# Patient Record
Sex: FEMALE | Race: AMERICAN INDIAN OR ALASKA NATIVE
[De-identification: names, ages, dates, MRNs, and addresses within clinical notes are randomized per-mention and may not be internally consistent; named-entity substitution may affect disease eponyms.]

---

## 2019-07-27 ENCOUNTER — HOSPITAL ENCOUNTER (EMERGENCY)
Dept: HOSPITAL 5 - ED | Age: 24
Discharge: HOME | End: 2019-07-27
Payer: SELF-PAY

## 2019-07-27 VITALS — DIASTOLIC BLOOD PRESSURE: 73 MMHG | SYSTOLIC BLOOD PRESSURE: 129 MMHG

## 2019-07-27 DIAGNOSIS — J02.9: Primary | ICD-10-CM

## 2019-07-27 PROCEDURE — 99283 EMERGENCY DEPT VISIT LOW MDM: CPT

## 2019-07-27 PROCEDURE — 87430 STREP A AG IA: CPT

## 2019-07-27 PROCEDURE — 96372 THER/PROPH/DIAG INJ SC/IM: CPT

## 2019-07-27 NOTE — EMERGENCY DEPARTMENT REPORT
ED ENT HPI





- General


Chief complaint: Sore Throat


Stated complaint: THROAT PAIN/UNABLE TO SWALLOW


Time Seen by Provider: 07/27/19 03:24


Source: patient


Mode of arrival: Ambulatory


Limitations: No Limitations





- History of Present Illness


MD complaint: sore throat





- Related Data


                                  Previous Rx's











 Medication  Instructions  Recorded  Last Taken  Type


 


Lidocaine Viscous 2% 10 ml MM Q3H PRN #240 ml 07/27/19 Unknown Rx











                                    Allergies











Allergy/AdvReac Type Severity Reaction Status Date / Time


 


No Known Allergies Allergy   Unverified 07/27/19 02:27














ED Dental HPI





- General


Chief complaint: Sore Throat


Stated complaint: THROAT PAIN/UNABLE TO SWALLOW


Time Seen by Provider: 07/27/19 03:24


Source: patient


Mode of arrival: Ambulatory


Limitations: No Limitations





- Related Data


                                  Previous Rx's











 Medication  Instructions  Recorded  Last Taken  Type


 


Lidocaine Viscous 2% 10 ml MM Q3H PRN #240 ml 07/27/19 Unknown Rx











                                    Allergies











Allergy/AdvReac Type Severity Reaction Status Date / Time


 


No Known Allergies Allergy   Unverified 07/27/19 02:27














ED Review of Systems


ROS: 


Stated complaint: THROAT PAIN/UNABLE TO SWALLOW


Other details as noted in HPI








ED Past Medical Hx





- Past Medical History


Previous Medical History?: No





- Surgical History


Past Surgical History?: Yes


Additional Surgical History: Abdominal Pain





- Social History


Smoking Status: Never Smoker


Substance Use Type: None





- Medications


Home Medications: 


                                Home Medications











 Medication  Instructions  Recorded  Confirmed  Last Taken  Type


 


Lidocaine Viscous 2% 10 ml MM Q3H PRN #240 ml 07/27/19  Unknown Rx














ED Physical Exam





- General


Limitations: No Limitations





ED Course





                                   Vital Signs











  07/27/19





  02:24


 


Temperature 99 F


 


Pulse Rate 94 H


 


Respiratory 16





Rate 


 


Blood Pressure 129/73


 


O2 Sat by Pulse 98





Oximetry 











Critical care attestation.: 


If time is entered above; I have spent that time in minutes in the direct care 

of this critically ill patient, excluding procedure time.








ED Disposition


Disposition: DC-01 TO HOME OR SELFCARE


Condition: Stable


Instructions:  Pharyngitis (ED), Strep Throat (ED), Mononucleosis (ED)


Prescriptions: 


Lidocaine Viscous 2% 10 ml MM Q3H PRN #240 ml


 PRN Reason: Sore Throat


Referrals: 


[facility] [Provider Group] - 3-5 Days

## 2020-09-06 ENCOUNTER — HOSPITAL ENCOUNTER (EMERGENCY)
Dept: HOSPITAL 5 - ED | Age: 25
Discharge: HOME | End: 2020-09-06
Payer: SELF-PAY

## 2020-09-06 VITALS — DIASTOLIC BLOOD PRESSURE: 92 MMHG | SYSTOLIC BLOOD PRESSURE: 159 MMHG

## 2020-09-06 DIAGNOSIS — J20.9: Primary | ICD-10-CM

## 2020-09-06 DIAGNOSIS — F12.10: ICD-10-CM

## 2020-09-06 PROCEDURE — 99283 EMERGENCY DEPT VISIT LOW MDM: CPT

## 2020-09-06 PROCEDURE — 71046 X-RAY EXAM CHEST 2 VIEWS: CPT

## 2020-09-06 PROCEDURE — 96372 THER/PROPH/DIAG INJ SC/IM: CPT

## 2020-09-06 NOTE — EMERGENCY DEPARTMENT REPORT
- General


Chief Complaint: Upper Respiratory Infection


Stated Complaint: BODYACHES/CP/HEADACHE/COUGH


Time Seen by Provider: 09/06/20 15:29


Source: patient


Mode of arrival: Ambulatory


Limitations: No Limitations





- History of Present Illness


Initial Comments: 





Patient is a 24-year-old female presents emergency room with complaints of 

generalized body aches that began 2 days ago.  She has associated dry cough, 

congestion, chest discomfort after frequent coughing.  She states that she does 

have some mild shortness of breath.  She states that she has had one episode of 

vomiting a day.  She states that she has had a few episodes of diarrhea.  She 

denies any fever, sore throat, ear pain, abdominal pain.  She denies any known 

sick contacts.  She denies any recent travel.  She states that she has been 

taking Mucinex and cold and flu medication over-the-counter.  She has a past 

medical history of childhood asthma.  No allergies to medications.  She endorses

marijuana use.





- Related Data


                                  Previous Rx's











 Medication  Instructions  Recorded  Last Taken  Type


 


Lidocaine Viscous 2% 10 ml MM Q3H PRN #240 ml 07/27/19 Unknown Rx


 


Albuterol Sulfate [Proventil Hfa] 6.7 gm IH TID PRN #1 hfa.aer.ad 09/06/20 

Unknown Rx


 


Azithromycin [Zithromax TAB] 250 mg PO QDAY 5 Days #6 tablet 09/06/20 Unknown Rx


 


Prednisone [predniSONE 10 mg 10 mg PO .TAPER #1 tab.ds.pk 09/06/20 Unknown Rx





(6-Day Pack, 21 Tabs)]    











                                    Allergies











Allergy/AdvReac Type Severity Reaction Status Date / Time


 


No Known Allergies Allergy   Unverified 07/27/19 02:27














ED Review of Systems


ROS: 


Stated complaint: BODYACHES/CP/HEADACHE/COUGH


Other details as noted in HPI





Comment: All other systems reviewed and negative





ED Past Medical Hx





- Past Medical History


Previous Medical History?: No





- Surgical History


Additional Surgical History: Abdominal Pain





- Social History


Smoking Status: Never Smoker


Substance Use Type: Marijuana





- Medications


Home Medications: 


                                Home Medications











 Medication  Instructions  Recorded  Confirmed  Last Taken  Type


 


Lidocaine Viscous 2% 10 ml MM Q3H PRN #240 ml 07/27/19  Unknown Rx


 


Albuterol Sulfate [Proventil Hfa] 6.7 gm IH TID PRN #1 hfa.aer.ad 09/06/20  

Unknown Rx


 


Azithromycin [Zithromax TAB] 250 mg PO QDAY 5 Days #6 tablet 09/06/20  Unknown 

Rx


 


Prednisone [predniSONE 10 mg 10 mg PO .TAPER #1 tab.ds.pk 09/06/20  Unknown Rx





(6-Day Pack, 21 Tabs)]     














ED Physical Exam





- General


Limitations: No Limitations


General appearance: alert, in no apparent distress





- Head


Head exam: Present: atraumatic, normocephalic





- Eye


Eye exam: Present: normal appearance





- ENT


ENT exam: Present: normal orophraynx, mucous membranes moist, TM's normal 

bilaterally, normal external ear exam





- Respiratory


Respiratory exam: Present: wheezes (expiratory bilaterally), rhonchi 

(bilaterally).  Absent: respiratory distress, rales, stridor, chest wall 

tenderness, accessory muscle use, decreased breath sounds, prolonged expiratory





- Cardiovascular


Cardiovascular Exam: Present: regular rate, normal rhythm, normal heart sounds. 

Absent: systolic murmur, diastolic murmur, rubs, gallop





- Neurological Exam


Neurological exam: Present: alert, oriented X3





- Psychiatric


Psychiatric exam: Present: normal affect, normal mood





- Skin


Skin exam: Present: warm, dry, intact





ED Course


                                   Vital Signs











  09/06/20 09/06/20





  14:35 17:30


 


Temperature 98.5 F 99.3 F


 


Pulse Rate 87 105 H


 


Respiratory 18 22





Rate  


 


Blood Pressure 152/90 


 


Blood Pressure  159/92





[Right]  


 


O2 Sat by Pulse 100 95





Oximetry  














ED Medical Decision Making





- Radiology Data


Radiology results: report reviewed





CHEST 2 VIEWS 





INDICATION / CLINICAL INFORMATION: cough, wheezing. 





COMPARISON: None available. 





FINDINGS: 





SUPPORT DEVICES: None. 


HEART / MEDIASTINUM: No significant abnormality. 


LUNGS / PLEURA: No significant pulmonary or pleural abnormality. Slight 

elevation of the left 


 hemidiaphragm. No pneumothorax. 





ADDITIONAL FINDINGS: No significant additional findings. 





IMPRESSION: 


1. No acute findings. 





Signer Name: BHARATI Mello MD 


Signed: 9/6/2020 3:59 PM 


Workstation Name: [id number] 








 Transcribed By: DT 


 Dictated By: Julian Mello MD 


 Electronically Authenticated By: Julian Mello MD 


 Signed Date/Time: 09/06/20 1559 











 DD/DT: 09/06/20 1559 


 TD/TT: 





- Medical Decision Making





Patient is a 24-year-old female presents emergency room with complaints of 

generalized body aches that began 2 days ago.  She has associated dry cough, 

congestion, chest discomfort after frequent coughing.  She states that she does 

have some mild shortness of breath.  She states that she has had one episode of 

vomiting a day.  She states that she has had a few episodes of diarrhea.  She 

denies any fever, sore throat, ear pain, abdominal pain.  She denies any known 

sick contacts.  She denies any recent travel.  She states that she has been 

taking Mucinex and cold and flu medication over-the-counter.  She has a past 

medical history of childhood asthma.  No allergies to medications.  She endorses

 marijuana use. VSS. no fever, no hypoxia.  On exam patient has expiratory 

wheezing and rhonchi bilaterally. CXR: 1. No acute findings.  Patient given 

nebulizer treatment and Decadron IM and on reexamination wheezing has completely

 resolved and patient is feeling much better and ready to go home.  Patient will

 be treated for acute bronchitis.  Patient given prescription for albuterol 

inhaler, steroids, azithromycin.  Patient is presenting with these symptoms 

during a COVID-19 pandemic, discussed COVID-19 with patient, discussed strict 

return precautions, discussed outpatient testing, discussed self quarantine with

 patient.  Patient does not meet hospital criteria for admission or for COVID-19

 hospital testing. advised pt Please take medication as prescribed.  Please 

increase your fluid intake over the next several days.  May take Tylenol as 

needed for fever or body aches.  May take over-the-counter cold symptom relief 

medication such as Mucinex or TheraFlu.  Follow-up with a primary care doctor 

for reexamination.  Return to emergency room immediately for any new or 

worsening symptoms including but not limited to difficulty breathing, shortness 

of breath, severe chest pain, unable to tolerate by mouth intake, etc.  Please 

self quarantine for 2 weeks from the onset of your symptoms.  Please do not go 

out in public.  If you are around others at home please wear a mask.  If you 

need to cough or sneeze please do so in a napkin and immediately throw it away 

and immediately wash your hands.  Wash your hands frequently.  Wipe everything 

down.  Recommend for you to get COVID-19 testing, may have this done at primary 

care doctor, health department, [facility] drive thru testing centers.





- Differential Diagnosis


URI, PNA, acute bronchitis, reactive airway, asthma, viral syndrome, COVID


Critical care attestation.: 


If time is entered above; I have spent that time in minutes in the direct care 

of this critically ill patient, excluding procedure time.








ED Disposition


Clinical Impression: 


Acute bronchitis


Qualifiers:


 Bronchitis organism: unspecified organism Qualified Code(s): J20.9 - Acute 

bronchitis, unspecified





Disposition: DC-01 TO HOME OR SELFCARE


Is pt being admited?: No


Does the pt Need Aspirin: No


Condition: Stable


Instructions:  COVID-19, Acute Bronchitis (ED)


Additional Instructions: 


Please take medication as prescribed.  Please increase your fluid intake over 

the next several days.  May take Tylenol as needed for fever or body aches.  May

 take over-the-counter cold symptom relief medication such as Mucinex or 

TheraFlu.  Follow-up with a primary care doctor for reexamination.  Return to 

emergency room immediately for any new or worsening symptoms including but not 

limited to difficulty breathing, shortness of breath, severe chest pain, unable 

to tolerate by mouth intake, etc.  Please self quarantine for 2 weeks from the 

onset of your symptoms.  Please do not go out in public.  If you are around 

others at home please wear a mask.  If you need to cough or sneeze please do so 

in a napkin and immediately throw it away and immediately wash your hands.  Wash

 your hands frequently.  Wipe everything down.  Recommend for you to get COVID-

19 testing, may have this done at primary care doctor, health department, [facility] thr testing centers.


Prescriptions: 


Prednisone [predniSONE 10 mg (6-Day Pack, 21 Tabs)] 10 mg PO .TAPER #1 tab.ds.pk


Albuterol Sulfate [Proventil Hfa] 6.7 gm IH TID PRN #1 hfa.aer.ad


 PRN Reason: Wheezing


Azithromycin [Zithromax TAB] 250 mg PO QDAY 5 Days #6 tablet


Referrals: 


PRIMARY CARE,MD [Primary Care Provider] - 2-3 Days


Time of Disposition: 17:11


Print Language: ENGLISH

## 2020-09-06 NOTE — XRAY REPORT
CHEST 2 VIEWS 



INDICATION / CLINICAL INFORMATION: cough, wheezing.



COMPARISON: None available.



FINDINGS:



SUPPORT DEVICES: None.

HEART / MEDIASTINUM: No significant abnormality. 

LUNGS / PLEURA: No significant pulmonary or pleural abnormality. Slight elevation of the left hemidia
phragm. No pneumothorax. 



ADDITIONAL FINDINGS: No significant additional findings.



IMPRESSION:

1. No acute findings.



Signer Name: BHARATI Mello MD 

Signed: 9/6/2020 3:59 PM

Workstation Name: "[id number]

## 2021-02-25 ENCOUNTER — HOSPITAL ENCOUNTER (EMERGENCY)
Dept: HOSPITAL 5 - ED | Age: 26
Discharge: HOME | End: 2021-02-25
Payer: SELF-PAY

## 2021-02-25 VITALS — SYSTOLIC BLOOD PRESSURE: 155 MMHG | DIASTOLIC BLOOD PRESSURE: 89 MMHG

## 2021-02-25 DIAGNOSIS — Z79.899: ICD-10-CM

## 2021-02-25 DIAGNOSIS — B34.9: Primary | ICD-10-CM

## 2021-02-25 PROCEDURE — 99283 EMERGENCY DEPT VISIT LOW MDM: CPT

## 2021-02-25 PROCEDURE — 87116 MYCOBACTERIA CULTURE: CPT

## 2021-02-25 PROCEDURE — 87430 STREP A AG IA: CPT

## 2021-03-03 ENCOUNTER — HOSPITAL ENCOUNTER (EMERGENCY)
Dept: HOSPITAL 5 - ED | Age: 26
Discharge: HOME | End: 2021-03-03
Payer: SELF-PAY

## 2021-03-03 VITALS — DIASTOLIC BLOOD PRESSURE: 88 MMHG | SYSTOLIC BLOOD PRESSURE: 133 MMHG

## 2021-03-03 DIAGNOSIS — Z79.899: ICD-10-CM

## 2021-03-03 DIAGNOSIS — R05: ICD-10-CM

## 2021-03-03 DIAGNOSIS — R09.89: ICD-10-CM

## 2021-03-03 DIAGNOSIS — Z98.890: ICD-10-CM

## 2021-03-03 DIAGNOSIS — J06.9: Primary | ICD-10-CM

## 2021-03-03 PROCEDURE — 71046 X-RAY EXAM CHEST 2 VIEWS: CPT
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
## 2021-03-03 NOTE — XRAY REPORT
CHEST 2 VIEWS 



INDICATION: 

cough.



COMPARISON: 

9/6/2020



FINDINGS:



SUPPORT DEVICES: None.



HEART: Within normal limits. 



LUNGS/PLEURA: No acute air space or interstitial disease.  No pneumothorax.



ADDITIONAL FINDINGS: None.



IMPRESSION:

1. No acute findings.



Signer Name: Casey Joyner MD 

Signed: 3/3/2021 3:33 PM

Workstation Name: [id number]

## 2021-07-10 ENCOUNTER — HOSPITAL ENCOUNTER (EMERGENCY)
Dept: HOSPITAL 5 - ED | Age: 26
Discharge: HOME | End: 2021-07-10
Payer: SELF-PAY

## 2021-07-10 VITALS — DIASTOLIC BLOOD PRESSURE: 89 MMHG | SYSTOLIC BLOOD PRESSURE: 147 MMHG

## 2021-07-10 DIAGNOSIS — Z71.1: ICD-10-CM

## 2021-07-10 DIAGNOSIS — J45.909: ICD-10-CM

## 2021-07-10 DIAGNOSIS — R30.0: ICD-10-CM

## 2021-07-10 DIAGNOSIS — N89.8: Primary | ICD-10-CM

## 2021-07-10 DIAGNOSIS — Z98.890: ICD-10-CM

## 2021-07-10 DIAGNOSIS — Z79.899: ICD-10-CM

## 2021-07-10 LAB
BILIRUB UR QL STRIP: (no result)
BLOOD UR QL VISUAL: (no result)
MUCOUS THREADS #/AREA URNS HPF: (no result) /HPF
PH UR STRIP: 8 [PH] (ref 5–7)
PROT UR STRIP-MCNC: (no result) MG/DL
RBC #/AREA URNS HPF: 2 /HPF (ref 0–6)
UROBILINOGEN UR-MCNC: < 2 MG/DL (ref ?–2)
WBC #/AREA URNS HPF: 18 /HPF (ref 0–6)

## 2021-07-10 PROCEDURE — 81001 URINALYSIS AUTO W/SCOPE: CPT

## 2021-07-10 PROCEDURE — 99283 EMERGENCY DEPT VISIT LOW MDM: CPT

## 2021-07-10 PROCEDURE — 96372 THER/PROPH/DIAG INJ SC/IM: CPT

## 2021-07-10 PROCEDURE — 81025 URINE PREGNANCY TEST: CPT

## 2021-07-10 PROCEDURE — 87086 URINE CULTURE/COLONY COUNT: CPT
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
